# Patient Record
(demographics unavailable — no encounter records)

---

## 2024-10-29 NOTE — HISTORY OF PRESENT ILLNESS
[FreeTextEntry1] : 60 year old woman with h/o chronic pruritus, COPD (long standing smoker), chronic fatigue, HLD, elevated ALP, NAFLD, chronic hyponatremia, h/o atherosclerosis seen on abdominal US, bipolar disorder.  Previously seen for atherosclerotic changes noted on abdominal ultrasounds.   Seen post hospital discharge at [facility] - 9/22-9/24 for multifocal CVA. Labs significant for: WBC 14.3, ,. Imaging demonstrates small discernible zone of hypoperfusion within the left parietal lobe on CT Neuro Perfusion. CTH stroke and CT angio Given/Started on Aspirin, Statin, Plavix. Pt was admitted to stroke unit for Acute multifocal stroke Pt was seen by neurologist Dr Duque. her TTE showed delayed bubble passage consistent with pulmonary shunt. Her LE dopplers negative for dvt. She also underwent CTA of chest and CT a/p and are not remarkable. She is recommended to see cardiologist as outpatient for event monitor or holter. She is cleared by neurologist for dc on asa and plavix for 21 days then continue asa. PT/OT bandar noted. Rec rehab but pt wants to go home. On disability since being diagnosed with bipolar disorder nearly 20 years ago.  Chronic smoker, down to several cigarettes per day x 45 years. Mother had history of coronary artery disease and had bypass surgery in her 80s.  Still claims she is off balance when she ambulates, getting home PT.

## 2024-10-29 NOTE — DISCUSSION/SUMMARY
[FreeTextEntry1] : Holter shows SR, no Afib, single run of 5 beats of NSVT. Normal LVEF reported.  No palpitations noted. Lengthy discussion regarding smoking cessation. If cardioembolic source needs further elucidation, will refer for ILR. Otherwise, f/u in 6 months for routine surveillance.

## 2025-04-29 NOTE — HISTORY OF PRESENT ILLNESS
----- Message from Des Williamson FreeTextEntry1] : 61 year old woman with h/o chronic pruritus, COPD (long standing smoker), chronic fatigue, HLD, elevated ALP, NAFLD, chronic hyponatremia, h/o atherosclerosis seen on abdominal US, bipolar disorder. Multifocal CVA in 9/24. TTE showed delayed bubble passage consistent with pulmonary shunt.   On disability since being diagnosed with bipolar disorder nearly 20 years ago. Chronic smoker, 45+ pack years, says she has more or less quit (although did smoke single cigarette yesterday).. Mother had history of coronary artery disease and had bypass surgery in her 80s.  Still claims she is off balance when she ambulates, getting home PT.

## 2025-04-29 NOTE — DISCUSSION/SUMMARY
Due to patient being non-English speaking/uses sign language, an  was used for this visit. Only for face-to-face interpretation by an external agency, date and length of interpretation can be found on the scanned worksheet.     name: Ghislaine Parr  Agency: [employer]  Language: [place]   Telephone number: [phone]  Type of interpretation: Face-to-face, spoken       [EKG obtained to assist in diagnosis and management of assessed problem(s)] : EKG obtained to assist in diagnosis and management of assessed problem(s) [FreeTextEntry1] : Holter shows SR, no Afib, single run of 5 beats of NSVT. Normal LVEF reported. Would recommend ILR to assess for occult dysrhythmias and c/o nightly palpitations (anxiety?) Lengthy discussion regarding continued smoking cessation.  f/u in 6 months for routine surveillance.

## 2025-04-29 NOTE — CARDIOLOGY SUMMARY
[de-identified] : 4/29/25, SR, LAE 6/11/24, sinus rhythm, poor R wave progression.  [de-identified] : 8/23/24, 1. Left ventricular cavity is normal in size. Left ventricular wall thickness is normal. Left ventricular systolic function is normal with an ejection fraction visually estimated at 65 to 70 %. There are no regional wall motion abnormalities seen. 2. Normal left ventricular diastolic function, with normal left ventricular filling pressure. 3. Normal right ventricular cavity size, with normal wall thickness, and normal right ventricular systolic function. 4. Trace mitral regurgitation. 5. Trace tricuspid regurgitation. 6. Lipomatous interatrial septal hypertrophy present. 7. There is calcification of the aortic valve leaflets. Fibrocalcific aortic valve sclerosis without stenosis. 8. Trace pulmonic regurgitation. [de-identified] : Arteriosclerotic changes of abdominal aorta seen on abdominal sonogram